# Patient Record
Sex: MALE
[De-identification: names, ages, dates, MRNs, and addresses within clinical notes are randomized per-mention and may not be internally consistent; named-entity substitution may affect disease eponyms.]

---

## 2018-03-12 ENCOUNTER — HOSPITAL ENCOUNTER (OUTPATIENT)
Dept: HOSPITAL 14 - H.ER | Age: 28
Setting detail: OBSERVATION
LOS: 1 days | Discharge: HOME | End: 2018-03-13
Attending: INTERNAL MEDICINE | Admitting: INTERNAL MEDICINE
Payer: MEDICAID

## 2018-03-12 DIAGNOSIS — G51.0: Primary | ICD-10-CM

## 2018-03-12 DIAGNOSIS — J45.909: ICD-10-CM

## 2018-03-12 LAB
ALBUMIN SERPL-MCNC: 3.7 G/DL (ref 3.5–5)
ALBUMIN/GLOB SERPL: 1.3 {RATIO} (ref 1–2.1)
ALT SERPL-CCNC: 45 U/L (ref 21–72)
APTT BLD: 32.1 SECONDS (ref 25.6–37.1)
AST SERPL-CCNC: 25 U/L (ref 17–59)
BASOPHILS # BLD AUTO: 0 K/UL (ref 0–0.2)
BASOPHILS NFR BLD: 0.6 % (ref 0–2)
BUN SERPL-MCNC: 14 MG/DL (ref 9–20)
CALCIUM SERPL-MCNC: 9.1 MG/DL (ref 8.4–10.2)
EOSINOPHIL # BLD AUTO: 0.2 K/UL (ref 0–0.7)
EOSINOPHIL NFR BLD: 3.1 % (ref 0–4)
ERYTHROCYTE [DISTWIDTH] IN BLOOD BY AUTOMATED COUNT: 13.5 % (ref 11.5–14.5)
GFR NON-AFRICAN AMERICAN: > 60
HDLC SERPL-MCNC: 42 MG/DL (ref 30–70)
HGB BLD-MCNC: 14.8 G/DL (ref 12–18)
INR PPP: 1.1 (ref 0.9–1.2)
LDLC SERPL-MCNC: 137 MG/DL (ref 0–129)
LYMPHOCYTES # BLD AUTO: 3 K/UL (ref 1–4.3)
LYMPHOCYTES NFR BLD AUTO: 39 % (ref 20–40)
MCH RBC QN AUTO: 29.8 PG (ref 27–31)
MCHC RBC AUTO-ENTMCNC: 33.1 G/DL (ref 33–37)
MCV RBC AUTO: 90.1 FL (ref 80–94)
MONOCYTES # BLD: 0.5 K/UL (ref 0–0.8)
MONOCYTES NFR BLD: 7 % (ref 0–10)
NEUTROPHILS # BLD: 3.9 K/UL (ref 1.8–7)
NEUTROPHILS NFR BLD AUTO: 50.3 % (ref 50–75)
NRBC BLD AUTO-RTO: 0.1 % (ref 0–0)
PLATELET # BLD: 282 K/UL (ref 130–400)
PMV BLD AUTO: 8.7 FL (ref 7.2–11.7)
PROTHROMBIN TIME: 11.9 SECONDS (ref 9.8–13.1)
RBC # BLD AUTO: 4.96 MIL/UL (ref 4.4–5.9)
WBC # BLD AUTO: 7.8 K/UL (ref 4.8–10.8)

## 2018-03-12 PROCEDURE — 93005 ELECTROCARDIOGRAM TRACING: CPT

## 2018-03-12 PROCEDURE — 93306 TTE W/DOPPLER COMPLETE: CPT

## 2018-03-12 PROCEDURE — 86850 RBC ANTIBODY SCREEN: CPT

## 2018-03-12 PROCEDURE — 82948 REAGENT STRIP/BLOOD GLUCOSE: CPT

## 2018-03-12 PROCEDURE — 85730 THROMBOPLASTIN TIME PARTIAL: CPT

## 2018-03-12 PROCEDURE — 36415 COLL VENOUS BLD VENIPUNCTURE: CPT

## 2018-03-12 PROCEDURE — 85025 COMPLETE CBC W/AUTO DIFF WBC: CPT

## 2018-03-12 PROCEDURE — 92526 ORAL FUNCTION THERAPY: CPT

## 2018-03-12 PROCEDURE — 92610 EVALUATE SWALLOWING FUNCTION: CPT

## 2018-03-12 PROCEDURE — 86900 BLOOD TYPING SEROLOGIC ABO: CPT

## 2018-03-12 PROCEDURE — 71045 X-RAY EXAM CHEST 1 VIEW: CPT

## 2018-03-12 PROCEDURE — 70553 MRI BRAIN STEM W/O & W/DYE: CPT

## 2018-03-12 PROCEDURE — 93880 EXTRACRANIAL BILAT STUDY: CPT

## 2018-03-12 PROCEDURE — 80048 BASIC METABOLIC PNL TOTAL CA: CPT

## 2018-03-12 PROCEDURE — 80053 COMPREHEN METABOLIC PANEL: CPT

## 2018-03-12 PROCEDURE — 80061 LIPID PANEL: CPT

## 2018-03-12 PROCEDURE — 85610 PROTHROMBIN TIME: CPT

## 2018-03-12 PROCEDURE — 99285 EMERGENCY DEPT VISIT HI MDM: CPT

## 2018-03-12 PROCEDURE — 83036 HEMOGLOBIN GLYCOSYLATED A1C: CPT

## 2018-03-12 PROCEDURE — 97161 PT EVAL LOW COMPLEX 20 MIN: CPT

## 2018-03-12 PROCEDURE — 83735 ASSAY OF MAGNESIUM: CPT

## 2018-03-12 PROCEDURE — 85027 COMPLETE CBC AUTOMATED: CPT

## 2018-03-12 PROCEDURE — 70450 CT HEAD/BRAIN W/O DYE: CPT

## 2018-03-12 PROCEDURE — 84484 ASSAY OF TROPONIN QUANT: CPT

## 2018-03-12 NOTE — CT
PROCEDURE:  CT HEAD WITHOUT CONTRAST.



HISTORY:

R facial droop



COMPARISON:

None available. 



TECHNIQUE:

Axial computed tomography images were obtained through the head/brain 

without intravenous contrast.  



Radiation dose:



Total exam DLP = 845.02 mGy-cm.



This CT exam was performed using one or more of the following dose 

reduction techniques: Automated exposure control, adjustment of the 

mA and/or kV according to patient size, and/or use of iterative 

reconstruction technique.



FINDINGS:



HEMORRHAGE:

No intracranial hemorrhage. 



BRAIN:

No mass effect or edema. The gray-white matter differentiation 

appears intact. Please note that MRI with diffusion imaging is more 

sensitive in the detection of acute ischemic event.



VENTRICLES:

No hydrocephalus. 



CALVARIUM:

Unremarkable.



PARANASAL SINUSES:

Mild mucosal thickening of the ethmoid air cells.



MASTOID AIR CELLS:

Opacification bilateral mastoid air cells. Correlate clinically for 

mastoiditis.



OTHER FINDINGS:

None.



IMPRESSION:

No acute intracranial pathology identified. 



Opacification bilateral mastoid air cells.  Correlate clinically for 

mastoiditis. 



Mild mucosal thickening of the ethmoid air cells.

## 2018-03-12 NOTE — ED PDOC
HPI: Neurologic





- General


Chief Complaint (Provider): facial weaknes


Source: patient


Exam Limitations: no limitations





- History of Present Illness


Timing/Duration: other (1 day )


Severity: mild


Associated Symptoms: other (facial numbness, right arm numbness forearm and 

ulnar side right hand and fifth digit, right eye epiphora)


Additional Complaint(s): 





27, yo , m, Asthma presents to ED c/o right facial weakness (right side)  

noticed yesterday 11AM while he was brushing his teeth, associated with right 

facial numbness, tongue numbness, taste change, right eye epiphora, right arm 

numbness forearm and ulnar side hand and fifth digit, right ear pain . Patient 

also noted occipital headache that subsided with 2 Excedrin tab. He denies fever

, cough, runny nose, SOB, Chest pain, arm or legs weakness, slurred speech, 

syncope, confusion, camping, skin rash, hx/o Lyme disease. On evaluation 

patient AAO x3, denies headache, persist numbness facial, hand and mild right  

bell's palsy 





PMD: Reena Kearney





<Antionette Black - Last Filed: 18 19:13>





<Rigo Caban III - Last Filed: 18 19:22>





- General


Chief Complaint (Nursing): Weakness/Neurological Deficit





- History of Present Illness


Allergies/Adverse Reactions: 


Allergies





No Known Allergies Allergy (Verified 16 18:32)


 








Home Medications: 


Ambulatory Orders





No Known Home Med  18 











NIHSS Stroke Scale





- Date/Time Evaluation Performed


Date Performed: 18


Time Performed: 17:30


When Was NIHSS Performed: Baseline





- How Severe is the Stroke


Level of Consciousness: 0=Alert


LOC to Questions: 0=Both comments correct


LOC to commands: 0=Obeys both correctly


Best Gaze: 0=Normal


Visual: 0=No visual loss


Facial: 1=Minor asymmetry


Motor Arm - Left: 0=No drift


Motor Arm - Right: 0=No drift


Motor Leg - Left: 0=No drift


Motor Leg - Right: 0=No drift


Limb Ataxia: 0=Absent


Sensory: 1=Mild to moderate loss


Best Language: 0=No aphasia


Dysarthia: 0=Normal articulation


Extinction & Inattention (Neglect): 0=Normal, no object


Score: 2





<Antionette Black - Last Filed: 18 19:13>





Supervising Attending Note





- Attestation:


I have personally seen and examined this patient.: Yes


I have fully participated in the care of the patient.: Yes


I have reviewed all pertinent clinical information, including history, physical 

exam and plan: Yes





- Notes:


Notes:: 





agree with resident findings including NIHSS


seen and evaluated with resident


eye patch and rewetting drops ordered for R eye


Possible bells palsy although given R arm numbness, must obtain MR imaging


dw Dr Berg on call medicine





<Rigo Caban III - Last Filed: 18 19:22>





Past Medical History


Reviewed: Historical Data, Nursing Documentation, Vital Signs


Vital Signs: 





 Last Vital Signs











Temp  98.1 F   18 16:14


 


Pulse  70   18 16:14


 


Resp  16   18 16:14


 


BP  121/76   18 16:14


 


Pulse Ox  98   18 16:14














- Medical History


PMH: Asthma





- Surgical History


Surgical History: No Surg Hx





- Family History


Family History: States: Unknown Family Hx





- Social History


Alcohol: Social


Drugs: Denies





<Antionette Black - Last Filed: 18 19:13>


Vital Signs: 





 Last Vital Signs











Temp  98.1 F   18 16:14


 


Pulse  70   18 16:14


 


Resp  16   18 16:14


 


BP  121/76   18 16:14


 


Pulse Ox  98   18 18:56














<Rigo Caban III - Last Filed: 18 19:22>





- Home Medications


Home Medications: 


 Ambulatory Orders











 Medication  Instructions  Recorded


 


No Known Home Med  18














- Allergies


Allergies/Adverse Reactions: 


 Allergies











Allergy/AdvReac Type Severity Reaction Status Date / Time


 


No Known Allergies Allergy   Verified 16 18:32














Review of Systems


ROS Statement: Except As Marked, All Systems Reviewed And Found Negative


Eyes: Positive for: Other (right eye discharge)


ENT: Positive for: Ear Pain (right ear pain )


Neurological: Positive for: Weakness (right face), Numbness (right face, right 

forearm)





<Antionette Black - Last Filed: 18 19:13>





Physical Exam





- Physical Exam


Appears: Positive for: Well, No Acute Distress


Head Exam: Positive for: ATRAUMATIC, NORMOCEPHALIC


Skin: Positive for: Rash (left side face hemangioma maxillar area)


Eye Exam: Positive for: EOMI, PERRL, Other (diminished strenght to close right 

eye).  Negative for: Nystagmus


ENT: Positive for: Normal ENT Inspection, Pharynx Is (normal ), TM Is/Are (

normal ).  Negative for: Nasal Congestion, Tonsillar Exudate, Tonsillar Swelling


Neck: Positive for: Normal, Supple


Cardiovascular/Chest: Positive for: Regular Rate, Rhythm.  Negative for: Murmur


Respiratory: Positive for: Normal Breath Sounds.  Negative for: Crackles, Rales

, Rhonchi, Wheezing


Gastrointestinal/Abdominal: Positive for: Soft.  Negative for: Tenderness, 

Distended, Guarding, Rebound


Back: Positive for: Normal Inspection


Extremity: Positive for: Normal ROM.  Negative for: Tenderness, Pedal Edema, 

Calf Tenderness


Neurologic/Psych: Positive for: Alert, Oriented, Motor/Sensory Deficits (

diminished tactil sensation right face, right forearm ant side, right hand 

ulnar side until fifth digit), Mood/Affect (normal ), Gait (normal ), Facial 

Droop (right side), Other (muscle strength 4 ext normal )





<Antionette Black - Last Filed: 18 19:13>





- Laboratory Results


Result Diagrams: 


 18 18:00





 18 18:00





- ECG


O2 Sat by Pulse Oximetry: 98





<Antionette Black - Last Filed: 18 19:13>





- Laboratory Results


Result Diagrams: 


 18 18:00





 18 18:00





<Rigo Caban III - Last Filed: 18 19:22>





Medical Decision Making


Medical Decision Makin:35


Impression 


Quanah Palsy


CVA





Differential 


MS, Lyme disease, Intracraneal brain tumor. 





Plan


CBC, CMP, Troponin, PT , PTT, lipid profile, Hgaa1c


EKG


CT Head w/o contrast 


IV fluid


-Aspirin











reevaluation


Labs reviewed: cBC, CMP normal. lipid profile: Hyperlipidemia





CT Head:IMPRESSION:


No acute intracranial pathology identified. 


Opacification bilateral mastoid air cells.  Correlate clinically for 

mastoiditis. 


Mild mucosal thickening of the ethmoid air cells.





Patient will have obs admission. Patient agree and verbalized understanding.  








<Antionette Black - Last Filed: 18 19:13>





Disposition





- Disposition


Disposition Time: 19:15





<Antionette Black - Last Filed: 18 19:13>





<Rigo Caban III - Last Filed: 18 19:22>





- Clinical Impression


Clinical Impression: 


 Bell's palsy, TIA (transient ischemic attack)








- Disposition


Condition: FAIR

## 2018-03-12 NOTE — CARD
--------------- APPROVED REPORT --------------





EKG Measurement

Heart [id number]

[place] [id number]

[id number]

[id number]

[id number]



<Conclusion>

Sinus bradycardia with sinus arrhythmia

Early repolarization

Otherwise normal ECG

## 2018-03-13 VITALS — RESPIRATION RATE: 17 BRPM | DIASTOLIC BLOOD PRESSURE: 66 MMHG | SYSTOLIC BLOOD PRESSURE: 106 MMHG | HEART RATE: 65 BPM

## 2018-03-13 VITALS — TEMPERATURE: 98.1 F | OXYGEN SATURATION: 98 %

## 2018-03-13 LAB
BUN SERPL-MCNC: 13 MG/DL (ref 9–20)
CALCIUM SERPL-MCNC: 8.9 MG/DL (ref 8.4–10.2)
ERYTHROCYTE [DISTWIDTH] IN BLOOD BY AUTOMATED COUNT: 13.6 % (ref 11.5–14.5)
GFR NON-AFRICAN AMERICAN: > 60
HDLC SERPL-MCNC: 38 MG/DL (ref 30–70)
HGB BLD-MCNC: 15.3 G/DL (ref 12–18)
LDLC SERPL-MCNC: 153 MG/DL (ref 0–129)
MCH RBC QN AUTO: 30.4 PG (ref 27–31)
MCHC RBC AUTO-ENTMCNC: 33.6 G/DL (ref 33–37)
MCV RBC AUTO: 90.6 FL (ref 80–94)
PLATELET # BLD: 286 K/UL (ref 130–400)
RBC # BLD AUTO: 5.03 MIL/UL (ref 4.4–5.9)
WBC # BLD AUTO: 8.4 K/UL (ref 4.8–10.8)

## 2018-03-13 RX ADMIN — METHYLPREDNISOLONE SODIUM SUCCINATE SCH MG: 40 INJECTION, POWDER, FOR SOLUTION INTRAMUSCULAR; INTRAVENOUS at 09:00

## 2018-03-13 RX ADMIN — METHYLPREDNISOLONE SODIUM SUCCINATE SCH MG: 40 INJECTION, POWDER, FOR SOLUTION INTRAMUSCULAR; INTRAVENOUS at 00:31

## 2018-03-13 NOTE — CARD
--------------- APPROVED REPORT --------------





EXAM: Two-dimensional and M-mode echocardiogram with Doppler and 

color Doppler.



Other Information 

Quality : GoodRhythm : NSR



INDICATION

CVA/TIA 



2D DIMENSIONS 

IVSd0.91   (0.7-1.1cm)LVDd4.63   (3.9-5.9cm)

LVOT Diameter1.75   (1.8-2.4cm)PWd0.93   (0.7-1.1cm)

IVSs1.29   (0.8-1.2cm)LVDs2.82   (2.5-4.0cm)

FS (%) 39.2   %PWs1.58   (0.8-1.2cm)



M-Mode DIMENSIONS 

Left Atrium (MM)3.63   (2.5-4.0cm)IVSd1.03   (0.7-1.1cm)

Aortic Root3.13   (2.2-3.7cm)LVDd4.71   (4.0-5.6cm)

Aortic Cusp Exc.2.01   (1.5-2.0cm)PWd1.00   (0.7-1.1cm)

IVSs1.41   cmFS (%) 45   %

LVDs2.58   (2.0-3.8cm)PWs1.43   cm



Mitral Valve

MV E Wqcgqkzf33.2cm/sMV DECEL [id number] A Cwayhqjm07.3cm/s

MV JII84bmQ/A ratio1.9MVA (PHT)3.81cm2



TDI

Lateral E' Peak V14.99cm/sMedial E' Peak V11.41cm/sE/Lateral 

E'3.5

E/Medial E'4.7



Pulmonary Valve

PV Peak Dxfanepn10.4cm/s



Tricuspid Valve

TR Peak Mynyktmo134kf/sRAP QGBAAMCI58wdNsXT Peak Gr.14mmHg

[id number]



 LEFT VENTRICLE 

The left ventricle is normal size.

There is normal left ventricular wall thickness.

The left ventricular function is normal.

The left ventricular ejection fraction is within the normal range.

The Ejection Fraction is 65-70%.

There is normal LV segmental wall motion.

The left ventricular diastolic function is normal.

No left ventricle thrombus noted on this study.



 RIGHT VENTRICLE 

The right ventricle is normal size.

The right ventricular systolic function is normal.



 ATRIA 

The left atrium size is normal.

The right atrium size is normal.



 AORTIC VALVE 

The aortic valve is normal in structure.

No aortic regurgitation is present.

There is no aortic valvular stenosis. 



 MITRAL VALVE 

The mitral valve is normal in structure.

There is no mitral valve stenosis.

There is no mitral valve regurgitation noted.



 TRICUSPID VALVE 

The tricuspid valve is normal in structure.

There is no tricuspid valve regurgitation noted.

There is no tricuspid valve stenosis. 



 PULMONIC VALVE 

The pulmonary valve is normal in structure.

There is no pulmonic valvular regurgitation. 



 GREAT VESSELS 

The aortic root is normal in size.

The IVC is normal in size and collapses >50% with inspiration.



 PERICARDIAL EFFUSION 

The pericardium appears normal.



<Conclusion>

The left ventricle is normal size.

The left ventricular function is normal.

The left ventricular ejection fraction is within the normal range.

The Ejection Fraction is 65-70%.

## 2018-03-13 NOTE — MRI
PROCEDURE:  MRI BRAIN WITH AND WITHOUT CONTRAST



HISTORY:

right facial droop and hand numbness (r/o MS)



COMPARISON:

None. 



TECHNIQUE:

Multiplanar, multisequence MR images of the brain were obtained with 

and without intravenous contrast enhancement. 18 cc of Omniscan 



FINDINGS:



HEMORRHAGE:

None



DWI:

No evidence of an acute or early subacute infarction.



BRAIN PARENCHYMA:

No mass,mass effect or edema. No atrophy or chronic microvascular 

ischemic changes.



ENHANCEMENT:

No abnormal intracranial enhancement.



VENTRICLES:

Unremarkable. No hydrocephalus.



CRANIUM:

Unremarkable.



ORBITS:

Grossly unremarkable.



PARANASAL SINUSES/MASTOIDS:

Clear



VASCULAR SYSTEM:

Skull base flow voids intact.



OTHER FINDINGS:

None .



IMPRESSION:

Unremarkable pre and post contrast enhanced MRI of the brain.

## 2018-03-13 NOTE — CP.PCM.HP
History of Present Illness





- History of Present Illness


History of Present Illness: 


Cc: Facial weakness





A 27 year old male with a pmhx of asthma who presents to the ED with complains 

of right sided facial weakness that started 2 days ago while he was brushing 

his teeth. The facial weakness is associated with right facial numbness, tongue 

numbness, taste change, right eye epiphora, right ear pain, right arm numbness 

on forearm and ulnar side of hand and fifth digit.  Patient also noted 

occipital headache that subsided after he took 2 Excedrin tablets. Denies chest 

pain, sob, fever, chills, cough, syncope, confusion, slurred speech,  arm or 

legs weakness, slurred speech or skin rashes.  Denies Lyme disease. Patient is 

aaox3, no focal deficits, denies headache, persistent facial or hand  numbness, 

has mild right  bell's palsy 





Present on Admission





- Present on Admission


Any Indicators Present on Admission: No





Review of Systems





- Review of Systems


All systems: reviewed and no additional remarkable complaints except (as stated)





- Constitutional


Constitutional: As Per HPI





- EENT


Eyes: As Per HPI


Ears: As Per HPI





- Cardiovascular


Cardiovascular: As Per HPI





- Respiratory


Respiratory: As Per HPI





- Gastrointestinal


Gastrointestinal: As Per HPI





- Neurological


Neurological: As Per HPI





Past Patient History





- Past Medical History & Family History


Past Medical History?: Yes


Pertinent Family History: 





States:Unknown





- Past Social History


Smoking Status: Never Smoked





- CARDIAC


Hx Cardiac Disorders: No





- PULMONARY


Hx Respiratory Disorders: Yes


Hx Asthma: Yes (10 Years ago)





- NEUROLOGICAL


Hx Neurological Disorder: No





- HEENT


Hx HEENT Problems: No





- RENAL


Hx Chronic Kidney Disease: No





- ENDOCRINE/METABOLIC


Hx Endocrine Disorders: No





- HEMATOLOGICAL/ONCOLOGICAL


Hx Blood Disorders: No


Hx AIDS: No


Hx Human Immunodeficiency Virus (HIV): No





- INTEGUMENTARY


Hx Dermatological Problems: No





- MUSCULOSKELETAL/RHEUMATOLOGICAL


Hx Musculoskeletal Disorders: No


Hx Falls: No





- GASTROINTESTINAL


Hx Gastrointestinal Disorders: No





- GENITOURINARY/GYNECOLOGICAL


Hx Genitourinary Disorders: No





- PSYCHIATRIC


Hx Psychophysiologic Disorder: No


Hx Substance Use: No





- SURGICAL HISTORY


Hx Surgeries: No





- ANESTHESIA


Hx Anesthesia: No


Hx Anesthesia Reactions: No





Meds


Home Medications: 


 Home Medication List











 Medication  Instructions  Recorded  Confirmed  Type


 


Acyclovir [Zovirax] 800 mg PO Q8 5 Days  tab 03/13/18  Rx











Allergies/Adverse Reactions: 


 Allergies











Allergy/AdvReac Type Severity Reaction Status Date / Time


 


No Known Allergies Allergy   Verified 04/25/16 18:32














Physical Exam





- Constitutional


Appears: Well, No Acute Distress





- Head Exam


Head Exam: ATRAUMATIC, NORMOCEPHALIC





- Eye Exam


Eye Exam: EOMI, PERRL


Pupil Exam: NORMAL ACCOMODATION


Additional comments: 





mild right facial droop





- ENT Exam


ENT Exam: Mucous Membranes Moist, Normal Exam





- Neck Exam


Neck exam: Positive for: Normal Inspection





- Respiratory Exam


Respiratory Exam: Clear to Auscultation Bilateral, NORMAL BREATHING PATTERN





- Cardiovascular Exam


Cardiovascular Exam: REGULAR RHYTHM, +S1, +S2





- GI/Abdominal Exam


GI & Abdominal Exam: Normal Bowel Sounds, Soft





- Rectal Exam


Rectal Exam: Deferred





- Extremities Exam


Extremities exam: Positive for: full ROM, normal inspection





- Back Exam


Back exam: NORMAL INSPECTION





- Neurological Exam


Neurological exam: Alert, Oriented x3


Additional comments: 





mild right facial droop





- Psychiatric Exam


Psychiatric exam: Normal Affect, Normal Mood





- Skin


Skin Exam: Dry, Normal Color, Warm





Results





- Vital Signs


Recent Vital Signs: 





 Last Vital Signs











Temp  98.1 F   03/13/18 16:23


 


Pulse  65   03/13/18 16:23


 


Resp  17   03/13/18 16:23


 


BP  106/66   03/13/18 16:23


 


Pulse Ox  98   03/13/18 16:23














- Labs


Result Diagrams: 


 03/13/18 04:25





 03/13/18 04:25


Labs: 





 Laboratory Results - last 24 hr











  03/12/18 03/12/18 03/12/18





  17:56 18:00 18:00


 


WBC   7.8 


 


RBC   4.96 


 


Hgb   14.8 


 


Hct   44.7 


 


MCV   90.1 


 


MCH   29.8 


 


MCHC   33.1 


 


RDW   13.5 


 


Plt Count   282 


 


MPV   8.7 


 


Neut % (Auto)   50.3 


 


Lymph % (Auto)   39.0 


 


Mono % (Auto)   7.0 


 


Eos % (Auto)   3.1 


 


Baso % (Auto)   0.6 


 


Neut # (Auto)   3.9 


 


Lymph # (Auto)   3.0 


 


Mono # (Auto)   0.5 


 


Eos # (Auto)   0.2 


 


Baso # (Auto)   0.0 


 


PT   


 


INR   


 


APTT   


 


Sodium    141


 


Potassium    3.8


 


Chloride    101


 


Carbon Dioxide    27


 


Anion Gap    17


 


BUN    14


 


Creatinine    0.9


 


Est GFR ( Amer)    > 60


 


Est GFR (Non-Af Amer)    > 60


 


POC Glucose (mg/dL)  102  


 


Random Glucose    101


 


Hemoglobin A1c   


 


Calcium    9.1


 


Magnesium   


 


Total Bilirubin    0.5


 


AST    25


 


ALT    45


 


Alkaline Phosphatase    58


 


Troponin I    < 0.0120


 


Total Protein    6.5


 


Albumin    3.7


 


Globulin    2.8


 


Albumin/Globulin Ratio    1.3


 


Triglycerides    162 H


 


Cholesterol    203 H


 


LDL Cholesterol Direct    137 H


 


HDL Cholesterol    42


 


Blood Type   


 


Antibody Screen   


 


BBK History Checked   














  03/12/18 03/12/18 03/12/18





  18:00 18:00 18:06


 


WBC   


 


RBC   


 


Hgb   


 


Hct   


 


MCV   


 


MCH   


 


MCHC   


 


RDW   


 


Plt Count   


 


MPV   


 


Neut % (Auto)   


 


Lymph % (Auto)   


 


Mono % (Auto)   


 


Eos % (Auto)   


 


Baso % (Auto)   


 


Neut # (Auto)   


 


Lymph # (Auto)   


 


Mono # (Auto)   


 


Eos # (Auto)   


 


Baso # (Auto)   


 


PT  11.9  


 


INR  1.1  


 


APTT  32.1  


 


Sodium   


 


Potassium   


 


Chloride   


 


Carbon Dioxide   


 


Anion Gap   


 


BUN   


 


Creatinine   


 


Est GFR ( Amer)   


 


Est GFR (Non-Af Amer)   


 


POC Glucose (mg/dL)   


 


Random Glucose   


 


Hemoglobin A1c    5.9


 


Calcium   


 


Magnesium   


 


Total Bilirubin   


 


AST   


 


ALT   


 


Alkaline Phosphatase   


 


Troponin I   


 


Total Protein   


 


Albumin   


 


Globulin   


 


Albumin/Globulin Ratio   


 


Triglycerides   


 


Cholesterol   


 


LDL Cholesterol Direct   


 


HDL Cholesterol   


 


Blood Type   A POSITIVE 


 


Antibody Screen   Negative 


 


BBK History Checked   No verified bt 














  03/13/18 03/13/18 03/13/18





  04:25 04:25 04:25


 


WBC  8.4  


 


RBC  5.03  


 


Hgb  15.3  


 


Hct  45.6  


 


MCV  90.6  


 


MCH  30.4  


 


MCHC  33.6  


 


RDW  13.6  


 


Plt Count  286  


 


MPV   


 


Neut % (Auto)   


 


Lymph % (Auto)   


 


Mono % (Auto)   


 


Eos % (Auto)   


 


Baso % (Auto)   


 


Neut # (Auto)   


 


Lymph # (Auto)   


 


Mono # (Auto)   


 


Eos # (Auto)   


 


Baso # (Auto)   


 


PT   


 


INR   


 


APTT   


 


Sodium   142 


 


Potassium   4.1 


 


Chloride   103 


 


Carbon Dioxide   25 


 


Anion Gap   18 


 


BUN   13 


 


Creatinine   0.8 


 


Est GFR ( Amer)   > 60 


 


Est GFR (Non-Af Amer)   > 60 


 


POC Glucose (mg/dL)   


 


Random Glucose   114 H 


 


Hemoglobin A1c    6.0


 


Calcium   8.9 


 


Magnesium   2.2 


 


Total Bilirubin   


 


AST   


 


ALT   


 


Alkaline Phosphatase   


 


Troponin I   


 


Total Protein   


 


Albumin   


 


Globulin   


 


Albumin/Globulin Ratio   


 


Triglycerides   135 


 


Cholesterol   217 H 


 


LDL Cholesterol Direct   153 H 


 


HDL Cholesterol   38 


 


Blood Type   


 


Antibody Screen   


 


BBK History Checked   














- Imaging and Cardiology


  ** CT scan - head


Additional comment: 





PROCEDURE:  CT HEAD WITHOUT CONTRAST.





HISTORY:


R facial droop





COMPARISON:


None available. 





TECHNIQUE:


Axial computed tomography images were obtained through the head/brain without 

intravenous contrast.  





Radiation dose:





Total exam DLP = 845.02 mGy-cm.





This CT exam was performed using one or more of the following dose reduction 

techniques: Automated exposure control, adjustment of the mA and/or kV 

according to patient size, and/or use of iterative reconstruction technique.





FINDINGS:





HEMORRHAGE:


No intracranial hemorrhage. 





BRAIN:


No mass effect or edema. The gray-white matter differentiation appears intact. 

Please note that MRI with diffusion imaging is more sensitive in the detection 

of acute ischemic event.





VENTRICLES:


No hydrocephalus. 





CALVARIUM:


Unremarkable.





PARANASAL SINUSES:


Mild mucosal thickening of the ethmoid air cells.





MASTOID AIR CELLS:


Opacification bilateral mastoid air cells. Correlate clinically for mastoiditis.





OTHER FINDINGS:


None.





IMPRESSION:


No acute intracranial pathology identified. 





Opacification bilateral mastoid air cells.  Correlate clinically for 

mastoiditis. 





Mild mucosal thickening of the ethmoid air cells.








  ** Chest x-ray


Additional comment: 





OMPARISON:


No prior. 





FINDINGS:





LUNGS:


No active pulmonary disease.





PLEURA:


No significant pleural effusion identified, no pneumothorax apparent.





CARDIOVASCULAR:


Normal.





OSSEOUS STRUCTURES:


No significant abnormalities.





VISUALIZED UPPER ABDOMEN:


Normal.





OTHER FINDINGS:


None.





IMPRESSION:


No active disease.





 [ Reading ]


 





 [ Conclusion ]


 





Assessment & Plan


(1) Bell's palsy


Status: Acute   Priority: High   





- Assessment and Plan (Free Text)


Assessment: 





27 year old male with pmhx of asthma presenting with right facial droop and 

right arm weakness


Plan: 





Bell's palsy


All imaging reports reviewed


MRI brain - no acute findings


Neuro evaluated patient and recommends outpt d/u


Will d/c pt home with outpatient f/u

## 2018-03-13 NOTE — CP.PCM.CON
History of Present Illness





- History of Present Illness


History of Present Illness: 





Mr. Cleveland is a 27-year-old man with no significant past medical history, who 

states that a few days ago, he was sleeping in a bed and got up and hit his 

neck.  Yesterday, he noticed right facial droop and right hand numbness.  The 

hand numbness improved, but then he had some neck pain, jaw pain/stiffness and 

tongue sensation of tingling.  He has some difficulty with closing the right 

eye and wrinkling his forehead.  





Review of Systems





- Review of Systems


All systems: reviewed and no additional remarkable complaints except





Past Patient History





- Past Medical History & Family History


Past Medical History?: Yes





- Past Social History


Smoking Status: Never Smoked





- CARDIAC


Hx Cardiac Disorders: No





- PULMONARY


Hx Respiratory Disorders: Yes


Hx Asthma: Yes (10 Years ago)





- NEUROLOGICAL


Hx Neurological Disorder: No





- HEENT


Hx HEENT Problems: No





- RENAL


Hx Chronic Kidney Disease: No





- ENDOCRINE/METABOLIC


Hx Endocrine Disorders: No





- HEMATOLOGICAL/ONCOLOGICAL


Hx Blood Disorders: No


Hx AIDS: No


Hx Human Immunodeficiency Virus (HIV): No





- INTEGUMENTARY


Hx Dermatological Problems: No





- MUSCULOSKELETAL/RHEUMATOLOGICAL


Hx Musculoskeletal Disorders: No


Hx Falls: No





- GASTROINTESTINAL


Hx Gastrointestinal Disorders: No





- GENITOURINARY/GYNECOLOGICAL


Hx Genitourinary Disorders: No





- PSYCHIATRIC


Hx Psychophysiologic Disorder: No


Hx Substance Use: No





- SURGICAL HISTORY


Hx Surgeries: No





- ANESTHESIA


Hx Anesthesia: No


Hx Anesthesia Reactions: No





Meds


Allergies/Adverse Reactions: 


 Allergies











Allergy/AdvReac Type Severity Reaction Status Date / Time


 


No Known Allergies Allergy   Verified 04/25/16 18:32














- Medications


Medications: 


 Current Medications





Artificial Tears (Artificial Tears)  2 drop OD Q4 PRN


   PRN Reason: Dry eyes


   Last Admin: 03/13/18 00:31 Dose:  2 drop


Aspirin (Aspirin Chewable)  81 mg PO DAILY [facility]


   Last Admin: 03/13/18 13:16 Dose:  81 mg


Enoxaparin Sodium (Lovenox)  40 mg SC DAILY [facility]


   PRN Reason: Protocol


   Last Admin: 03/13/18 10:18 Dose:  40 mg


Acyclovir 800 mg/ Sodium (Chloride)  250 mls @ 250 mls/hr IVPB Q8 [facility]


   PRN Reason: Protocol


   Last Admin: 03/13/18 00:30 Dose:  250 mls/hr


Methylprednisolone (Solu-Medrol)  40 mg IV Q12 [facility]


   Last Admin: 03/13/18 09:00 Dose:  40 mg











Physical Exam





- Neurological Exam


Neurological exam: Alert, Normal Gait, Oriented x3, Reflexes Normal


Additional comments: 





7th nerve palsy on the right side.  Otherwise normal neuro exam and no other CN 

deficits noted.  Full strength, normal sensation, normal reflexes, normal gait.





Results





- Vital Signs


Recent Vital Signs: 


 Last Vital Signs











Temp  98.1 F   03/13/18 13:07


 


Pulse  72   03/13/18 13:07


 


Resp  18   03/13/18 13:07


 


BP  116/77   03/13/18 13:07


 


Pulse Ox  98   03/13/18 13:07














- Labs


Result Diagrams: 


 03/13/18 04:25





 03/13/18 04:25


Labs: 


 Laboratory Results - last 24 hr











  03/12/18 03/12/18 03/12/18





  17:56 18:00 18:00


 


WBC   7.8 


 


RBC   4.96 


 


Hgb   14.8 


 


Hct   44.7 


 


MCV   90.1 


 


MCH   29.8 


 


MCHC   33.1 


 


RDW   13.5 


 


Plt Count   282 


 


MPV   8.7 


 


Neut % (Auto)   50.3 


 


Lymph % (Auto)   39.0 


 


Mono % (Auto)   7.0 


 


Eos % (Auto)   3.1 


 


Baso % (Auto)   0.6 


 


Neut # (Auto)   3.9 


 


Lymph # (Auto)   3.0 


 


Mono # (Auto)   0.5 


 


Eos # (Auto)   0.2 


 


Baso # (Auto)   0.0 


 


PT   


 


INR   


 


APTT   


 


Sodium    141


 


Potassium    3.8


 


Chloride    101


 


Carbon Dioxide    27


 


Anion Gap    17


 


BUN    14


 


Creatinine    0.9


 


Est GFR ( Amer)    > 60


 


Est GFR (Non-Af Amer)    > 60


 


POC Glucose (mg/dL)  102  


 


Random Glucose    101


 


Hemoglobin A1c   


 


Calcium    9.1


 


Magnesium   


 


Total Bilirubin    0.5


 


AST    25


 


ALT    45


 


Alkaline Phosphatase    58


 


Troponin I    < 0.0120


 


Total Protein    6.5


 


Albumin    3.7


 


Globulin    2.8


 


Albumin/Globulin Ratio    1.3


 


Triglycerides    162 H


 


Cholesterol    203 H


 


LDL Cholesterol Direct    137 H


 


HDL Cholesterol    42


 


Blood Type   


 


Antibody Screen   


 


BBK History Checked   














  03/12/18 03/12/18 03/12/18





  18:00 18:00 18:06


 


WBC   


 


RBC   


 


Hgb   


 


Hct   


 


MCV   


 


MCH   


 


MCHC   


 


RDW   


 


Plt Count   


 


MPV   


 


Neut % (Auto)   


 


Lymph % (Auto)   


 


Mono % (Auto)   


 


Eos % (Auto)   


 


Baso % (Auto)   


 


Neut # (Auto)   


 


Lymph # (Auto)   


 


Mono # (Auto)   


 


Eos # (Auto)   


 


Baso # (Auto)   


 


PT  11.9  


 


INR  1.1  


 


APTT  32.1  


 


Sodium   


 


Potassium   


 


Chloride   


 


Carbon Dioxide   


 


Anion Gap   


 


BUN   


 


Creatinine   


 


Est GFR ( Amer)   


 


Est GFR (Non-Af Amer)   


 


POC Glucose (mg/dL)   


 


Random Glucose   


 


Hemoglobin A1c    5.9


 


Calcium   


 


Magnesium   


 


Total Bilirubin   


 


AST   


 


ALT   


 


Alkaline Phosphatase   


 


Troponin I   


 


Total Protein   


 


Albumin   


 


Globulin   


 


Albumin/Globulin Ratio   


 


Triglycerides   


 


Cholesterol   


 


LDL Cholesterol Direct   


 


HDL Cholesterol   


 


Blood Type   A POSITIVE 


 


Antibody Screen   Negative 


 


BBK History Checked   No verified bt 














  03/13/18 03/13/18 03/13/18





  04:25 04:25 04:25


 


WBC  8.4  


 


RBC  5.03  


 


Hgb  15.3  


 


Hct  45.6  


 


MCV  90.6  


 


MCH  30.4  


 


MCHC  33.6  


 


RDW  13.6  


 


Plt Count  286  


 


MPV   


 


Neut % (Auto)   


 


Lymph % (Auto)   


 


Mono % (Auto)   


 


Eos % (Auto)   


 


Baso % (Auto)   


 


Neut # (Auto)   


 


Lymph # (Auto)   


 


Mono # (Auto)   


 


Eos # (Auto)   


 


Baso # (Auto)   


 


PT   


 


INR   


 


APTT   


 


Sodium   142 


 


Potassium   4.1 


 


Chloride   103 


 


Carbon Dioxide   25 


 


Anion Gap   18 


 


BUN   13 


 


Creatinine   0.8 


 


Est GFR ( Amer)   > 60 


 


Est GFR (Non-Af Amer)   > 60 


 


POC Glucose (mg/dL)   


 


Random Glucose   114 H 


 


Hemoglobin A1c    6.0


 


Calcium   8.9 


 


Magnesium   2.2 


 


Total Bilirubin   


 


AST   


 


ALT   


 


Alkaline Phosphatase   


 


Troponin I   


 


Total Protein   


 


Albumin   


 


Globulin   


 


Albumin/Globulin Ratio   


 


Triglycerides   135 


 


Cholesterol   217 H 


 


LDL Cholesterol Direct   153 H 


 


HDL Cholesterol   38 


 


Blood Type   


 


Antibody Screen   


 


BBK History Checked   














Assessment & Plan


(1) Bell's palsy


Assessment and Plan: 


There is concern for a central cause since the patient did also have some right 

hand numbness.  I would like to obtain an MRI with contrast of the brain for 

further evaluation.  If it is normal, then continue treatment for Bell's palsy 

with a 10 day taper of solumedrol and acyclovir. 





Thank you. 


Status: Acute   Priority: High

## 2018-03-13 NOTE — CP.PCM.DIS
Provider





- Provider


Date of Admission: 


03/12/18 18:58





Attending physician: 


Sue Berg MD





Time Spent in preparation of Discharge (in minutes): 20





Diagnosis





- Discharge Diagnosis


(1) Bell's palsy


Status: Acute   





Hospital Course





- Lab Results


Lab Results: 


 Most Recent Lab Values











WBC  8.4 K/uL (4.8-10.8)   03/13/18  04:25    


 


RBC  5.03 Mil/uL (4.40-5.90)   03/13/18  04:25    


 


Hgb  15.3 g/dL (12.0-18.0)   03/13/18  04:25    


 


Hct  45.6 % (35.0-51.0)   03/13/18  04:25    


 


MCV  90.6 fl (80.0-94.0)   03/13/18  04:25    


 


MCH  30.4 pg (27.0-31.0)   03/13/18  04:25    


 


MCHC  33.6 g/dL (33.0-37.0)   03/13/18  04:25    


 


RDW  13.6 % (11.5-14.5)   03/13/18  04:25    


 


Plt Count  286 K/uL (130-400)   03/13/18  04:25    


 


MPV  8.7 fl (7.2-11.7)   03/12/18  18:00    


 


Neut % (Auto)  50.3 % (50.0-75.0)   03/12/18  18:00    


 


Lymph % (Auto)  39.0 % (20.0-40.0)   03/12/18  18:00    


 


Mono % (Auto)  7.0 % (0.0-10.0)   03/12/18  18:00    


 


Eos % (Auto)  3.1 % (0.0-4.0)   03/12/18  18:00    


 


Baso % (Auto)  0.6 % (0.0-2.0)   03/12/18  18:00    


 


Neut # (Auto)  3.9 K/uL (1.8-7.0)   03/12/18  18:00    


 


Lymph # (Auto)  3.0 K/uL (1.0-4.3)   03/12/18  18:00    


 


Mono # (Auto)  0.5 K/uL (0.0-0.8)   03/12/18  18:00    


 


Eos # (Auto)  0.2 K/uL (0.0-0.7)   03/12/18  18:00    


 


Baso # (Auto)  0.0 K/uL (0.0-0.2)   03/12/18  18:00    


 


PT  11.9 Seconds (9.8-13.1)   03/12/18  18:00    


 


INR  1.1  (0.9-1.2)   03/12/18  18:00    


 


APTT  32.1 Seconds (25.6-37.1)   03/12/18  18:00    


 


Sodium  142 mmol/l (132-148)   03/13/18  04:25    


 


Potassium  4.1 MMOL/L (3.6-5.0)   03/13/18  04:25    


 


Chloride  103 mmol/L ()   03/13/18  04:25    


 


Carbon Dioxide  25 mmol/L (22-30)   03/13/18  04:25    


 


Anion Gap  18  (10-20)   03/13/18  04:25    


 


BUN  13 mg/dl (9-20)   03/13/18  04:25    


 


Creatinine  0.8 mg/dl (0.8-1.5)   03/13/18  04:25    


 


Est GFR ( Amer)  > 60   03/13/18  04:25    


 


Est GFR (Non-Af Amer)  > 60   03/13/18  04:25    


 


POC Glucose (mg/dL)  102 mg/dL ()   03/12/18  17:56    


 


Random Glucose  114 mg/dL ()  H  03/13/18  04:25    


 


Hemoglobin A1c  6.0 % (4.2-6.5)   03/13/18  04:25    


 


Calcium  8.9 mg/dL (8.4-10.2)   03/13/18  04:25    


 


Magnesium  2.2 MG/DL (1.6-2.3)   03/13/18  04:25    


 


Total Bilirubin  0.5 mg/dl (0.2-1.3)   03/12/18  18:00    


 


AST  25 U/L (17-59)   03/12/18  18:00    


 


ALT  45 U/L (21-72)   03/12/18  18:00    


 


Alkaline Phosphatase  58 U/L ()   03/12/18  18:00    


 


Troponin I  < 0.0120 ng/mL (0.00-0.120)   03/12/18  18:00    


 


Total Protein  6.5 G/DL (6.3-8.2)   03/12/18  18:00    


 


Albumin  3.7 g/dL (3.5-5.0)   03/12/18  18:00    


 


Globulin  2.8 gm/dL (2.2-3.9)   03/12/18  18:00    


 


Albumin/Globulin Ratio  1.3  (1.0-2.1)   03/12/18  18:00    


 


Triglycerides  135 mg/DL (0-149)   03/13/18  04:25    


 


Cholesterol  217 mg/dL (0-199)  H  03/13/18  04:25    


 


LDL Cholesterol Direct  153 mg/dL (0-129)  H  03/13/18  04:25    


 


HDL Cholesterol  38 MG/DL (30-70)   03/13/18  04:25    


 


Blood Type  A POSITIVE   03/12/18  18:00    


 


Antibody Screen  Negative   03/12/18  18:00    


 


BBK History Checked  No verified bt   03/12/18  18:00    














- Hospital Course


Hospital Course: 





27 year old male with history of asthma presenting with right facial droop and 

right arm numbness. No more numbness present, imaging studies all negative. 

Patient seen by neurologist who recommends outpatient follow up. Pt discharged 

home with follow up instructions. 





Discharge Exam





- Head Exam


Head Exam: ATRAUMATIC, NORMOCEPHALIC





- Eye Exam


Eye Exam: EOMI





- Respiratory Exam


Respiratory Exam: Clear to PA & Lateral, NORMAL BREATHING PATTERN





- Cardiovascular Exam


Cardiovascular Exam: REGULAR RHYTHM, +S1, +S2





- GI/Abdominal Exam


GI & Abdominal Exam: Normal Bowel Sounds, Soft





- Neurological Exam


Neurological exam: Alert, Oriented x3





- Psychiatric Exam


Psychiatric exam: Normal Affect, Normal Mood





- Skin


Skin Exam: Normal Color, Warm





Discharge Plan





- Discharge Medications


Prescriptions: 


Acyclovir [Zovirax] 800 mg PO Q8 5 Days  tab





- Follow Up Plan


Condition: STABLE


Disposition: HOME/ ROUTINE


Instructions:  Bell's Palsy (DC), Methylprednisolone


Additional Instructions: 


follow up with pmd in 1 week


Referrals: 


Christel Valles MD [Family Provider] - 


Hasmukh Louis MD [Medical Doctor] -

## 2018-03-13 NOTE — US
PROCEDURE:  Duplex ultrasound  of the carotid and vertebral arteries. 



HISTORY:

TIA



COMPARISON:

None available. 



TECHNIQUE:

Grayscale and duplex Doppler evaluation of the cervical carotid and 

vertebral arteries were performed. The common carotid, carotid 

bifurcations and cervical ICA and proximal ECA were evaluated.  The 

vertebral arteries were evaluated for gross patency and direction. 



FINDINGS:



RIGHT CAROTID ARTERIES:

Common Carotid Artery: Normal. Maximal flow velocity of 148.0 cm/s.



Carotid Bifurcation: Normal.



Internal Carotid Artery:Normal. Maximal flow velocity of 91.4 cm/s.



External Carotid Artery (proximal branches): Normal. Maximal flow 

velocity of 128.6 cm/s.



ICA/CCA Ratio: 0.6



LEFT CAROTID ARTERIES:

Common Carotid Artery: Normal. Maximal flow velocity of 181.0 cm/s.



Carotid Bifurcation: Normal.



Internal Carotid Artery:Normal. Maximal flow velocity of 119.9 cm/s.



External Carotid Artery (proximal branches): Normal. Maximal flow 

velocity of 116 cm/s.



ICA/CCA Ratio: 0.8



VERTEBRAL ARTERIES:

Right Vertebral Artery: Patent. Antegrade flow.



Left Vertebral Artery: Patent. Antegrade flow.



OTHER FINDINGS:

None.



IMPRESSION:

Per NASCET criteria, no stenosis of the internal carotid arteries 

bilaterally.